# Patient Record
Sex: FEMALE | Race: WHITE
[De-identification: names, ages, dates, MRNs, and addresses within clinical notes are randomized per-mention and may not be internally consistent; named-entity substitution may affect disease eponyms.]

---

## 2020-10-28 PROBLEM — Z00.00 ENCOUNTER FOR PREVENTIVE HEALTH EXAMINATION: Status: ACTIVE | Noted: 2020-10-28

## 2020-11-11 ENCOUNTER — LABORATORY RESULT (OUTPATIENT)
Age: 55
End: 2020-11-11

## 2020-11-11 ENCOUNTER — APPOINTMENT (OUTPATIENT)
Dept: GYNECOLOGIC ONCOLOGY | Facility: CLINIC | Age: 55
End: 2020-11-11
Payer: MEDICAID

## 2020-11-11 VITALS
HEART RATE: 77 BPM | TEMPERATURE: 98 F | WEIGHT: 120 LBS | HEIGHT: 63 IN | BODY MASS INDEX: 21.26 KG/M2 | OXYGEN SATURATION: 96 % | SYSTOLIC BLOOD PRESSURE: 128 MMHG | RESPIRATION RATE: 16 BRPM | DIASTOLIC BLOOD PRESSURE: 77 MMHG

## 2020-11-11 DIAGNOSIS — N93.9 ABNORMAL UTERINE AND VAGINAL BLEEDING, UNSPECIFIED: ICD-10-CM

## 2020-11-11 DIAGNOSIS — N83.209 UNSPECIFIED OVARIAN CYST, UNSPECIFIED SIDE: ICD-10-CM

## 2020-11-11 PROCEDURE — 99205 OFFICE O/P NEW HI 60 MIN: CPT | Mod: 25

## 2020-11-11 PROCEDURE — 58100 BIOPSY OF UTERUS LINING: CPT

## 2020-11-11 PROCEDURE — 99072 ADDL SUPL MATRL&STAF TM PHE: CPT

## 2020-11-13 RX ORDER — LISINOPRIL 30 MG/1
TABLET ORAL
Refills: 0 | Status: ACTIVE | COMMUNITY

## 2020-11-13 NOTE — PHYSICAL EXAM
[Normal] : Recto-Vaginal Exam: Normal [Abnormal] : Adnexa(ae): Abnormal [Uterine Adnexae] : normal left adnexa [___] : a [unfilled] ~Ucm right mass [FreeTextEntry1] : smooth, mobile [Fully active, able to carry on all pre-disease performance without restriction] : Status 0 - Fully active, able to carry on all pre-disease performance without restriction

## 2020-11-13 NOTE — PAST MEDICAL HISTORY
[Perimenopausal] : The patient is perimenopausal [Menarche Age ____] : age at menarche was [unfilled] [Total Preg ___] : G[unfilled] [Live Births ___] : P[unfilled]  [FreeTextEntry5] : 7 vaginal deliveries

## 2020-11-13 NOTE — HISTORY OF PRESENT ILLNESS
[FreeTextEntry1] : Problem\par 1) Ovarian Cyst\par \par Previous Therapy\par 1) Pelvic US 10/13/2020\par  a) Uterus 10 cm, endometrial stripe 9mm, R ovary with 5cm simple cyst (Stable in size since 8/2020) \par \par 56 yo referred by Dr. Johnson for large simple ovarian cyst.

## 2020-11-13 NOTE — DISCUSSION/SUMMARY
[FreeTextEntry1] : We discussed the differential diagnosis which includes benign, low malignant potential tumors and malignancy. Other etiologies of adnexal masses, such as metastatic disease from another organ sites also discussed.\par \par In the case of asymptomatic cysts, without findings concerning for malignancy (such as solid components, increasing size, vascular septations, and elevated tumor markers) conservative management is an option. \par \par In the case of symptomatic cysts, or those with findings concerning for malignancy, the recommendation is for surgical removal. Again, with the aid of diagrams, different surgical approaches were discussed including minimally invasive and open approaches.\par \par Stable simple cyst, patient asymptomatic\par Repeat pelvic US in 3 months\par EMB done today due to DUB will call with results

## 2020-11-13 NOTE — PROCEDURE
[Endometrial Biopsy] : an endometrial biopsy [Other: ___] : [unfilled] [Patient] : the patient [Verbal Consent] : verbal consent was obtained prior to the procedure and is detailed in the patient's record [Yes] : the specimen was sent to pathology [No Complications] : none [Tolerated Well] : the patient tolerated the procedure well [FreeTextEntry1] : uterus sounded 8 cm, adequate tissue obtained

## 2025-02-24 ENCOUNTER — APPOINTMENT (OUTPATIENT)
Dept: COLORECTAL SURGERY | Facility: CLINIC | Age: 60
End: 2025-02-24
Payer: MEDICAID

## 2025-02-24 ENCOUNTER — NON-APPOINTMENT (OUTPATIENT)
Age: 60
End: 2025-02-24

## 2025-02-24 VITALS
BODY MASS INDEX: 35.85 KG/M2 | HEIGHT: 64 IN | DIASTOLIC BLOOD PRESSURE: 81 MMHG | HEART RATE: 73 BPM | WEIGHT: 210 LBS | TEMPERATURE: 97.8 F | SYSTOLIC BLOOD PRESSURE: 124 MMHG

## 2025-02-24 DIAGNOSIS — K60.2 ANAL FISSURE, UNSPECIFIED: ICD-10-CM

## 2025-02-24 PROCEDURE — 99202 OFFICE O/P NEW SF 15 MIN: CPT | Mod: 25

## 2025-02-24 PROCEDURE — 46600 DIAGNOSTIC ANOSCOPY SPX: CPT

## 2025-03-24 ENCOUNTER — APPOINTMENT (OUTPATIENT)
Dept: COLORECTAL SURGERY | Facility: CLINIC | Age: 60
End: 2025-03-24
Payer: MEDICAID

## 2025-03-24 VITALS
WEIGHT: 210 LBS | SYSTOLIC BLOOD PRESSURE: 111 MMHG | HEIGHT: 64 IN | TEMPERATURE: 98.1 F | BODY MASS INDEX: 35.85 KG/M2 | DIASTOLIC BLOOD PRESSURE: 74 MMHG | HEART RATE: 56 BPM

## 2025-03-24 DIAGNOSIS — K60.2 ANAL FISSURE, UNSPECIFIED: ICD-10-CM

## 2025-03-24 PROCEDURE — 99213 OFFICE O/P EST LOW 20 MIN: CPT

## 2025-06-30 ENCOUNTER — APPOINTMENT (OUTPATIENT)
Dept: COLORECTAL SURGERY | Facility: CLINIC | Age: 60
End: 2025-06-30
Payer: MEDICAID

## 2025-06-30 VITALS
DIASTOLIC BLOOD PRESSURE: 61 MMHG | HEART RATE: 73 BPM | SYSTOLIC BLOOD PRESSURE: 105 MMHG | TEMPERATURE: 98.1 F | WEIGHT: 210 LBS | HEIGHT: 64 IN | BODY MASS INDEX: 35.85 KG/M2

## 2025-06-30 PROCEDURE — 99213 OFFICE O/P EST LOW 20 MIN: CPT | Mod: 25

## 2025-06-30 PROCEDURE — 46600 DIAGNOSTIC ANOSCOPY SPX: CPT

## 2025-07-16 PROBLEM — K42.9 UMBILICAL HERNIA: Status: ACTIVE | Noted: 2025-07-16

## 2025-07-28 ENCOUNTER — APPOINTMENT (OUTPATIENT)
Facility: CLINIC | Age: 60
End: 2025-07-28
Payer: MEDICAID

## 2025-07-28 VITALS
OXYGEN SATURATION: 96 % | SYSTOLIC BLOOD PRESSURE: 151 MMHG | TEMPERATURE: 97.3 F | BODY MASS INDEX: 35.85 KG/M2 | HEIGHT: 64 IN | HEART RATE: 101 BPM | WEIGHT: 210 LBS | DIASTOLIC BLOOD PRESSURE: 84 MMHG

## 2025-07-28 DIAGNOSIS — K42.9 UMBILICAL HERNIA W/OUT OBSTRUCTION OR GANGRENE: ICD-10-CM

## 2025-07-28 PROCEDURE — 99204 OFFICE O/P NEW MOD 45 MIN: CPT

## 2025-07-30 ENCOUNTER — RESULT REVIEW (OUTPATIENT)
Age: 60
End: 2025-07-30

## 2025-08-06 ENCOUNTER — OUTPATIENT (OUTPATIENT)
Dept: OUTPATIENT SERVICES | Facility: HOSPITAL | Age: 60
LOS: 1 days | End: 2025-08-06
Payer: MEDICAID

## 2025-08-06 ENCOUNTER — APPOINTMENT (OUTPATIENT)
Dept: CT IMAGING | Facility: HOSPITAL | Age: 60
End: 2025-08-06

## 2025-08-06 PROCEDURE — 74176 CT ABD & PELVIS W/O CONTRAST: CPT | Mod: 26

## 2025-09-10 ENCOUNTER — NON-APPOINTMENT (OUTPATIENT)
Age: 60
End: 2025-09-10

## 2025-09-17 ENCOUNTER — APPOINTMENT (OUTPATIENT)
Facility: CLINIC | Age: 60
End: 2025-09-17

## 2025-09-17 VITALS
TEMPERATURE: 98.3 F | DIASTOLIC BLOOD PRESSURE: 70 MMHG | OXYGEN SATURATION: 97 % | SYSTOLIC BLOOD PRESSURE: 133 MMHG | HEART RATE: 70 BPM | BODY MASS INDEX: 35.85 KG/M2 | HEIGHT: 64 IN | WEIGHT: 210 LBS

## 2025-09-17 DIAGNOSIS — K76.9 LIVER DISEASE, UNSPECIFIED: ICD-10-CM

## 2025-09-17 DIAGNOSIS — K42.9 UMBILICAL HERNIA W/OUT OBSTRUCTION OR GANGRENE: ICD-10-CM
